# Patient Record
Sex: MALE | NOT HISPANIC OR LATINO
[De-identification: names, ages, dates, MRNs, and addresses within clinical notes are randomized per-mention and may not be internally consistent; named-entity substitution may affect disease eponyms.]

---

## 2019-02-06 PROBLEM — Z00.00 ENCOUNTER FOR PREVENTIVE HEALTH EXAMINATION: Status: ACTIVE | Noted: 2019-02-06

## 2019-02-14 ENCOUNTER — APPOINTMENT (OUTPATIENT)
Dept: UROLOGY | Facility: CLINIC | Age: 49
End: 2019-02-14
Payer: MEDICAID

## 2019-02-14 VITALS
TEMPERATURE: 98.3 F | HEIGHT: 73 IN | BODY MASS INDEX: 29.16 KG/M2 | HEART RATE: 74 BPM | DIASTOLIC BLOOD PRESSURE: 90 MMHG | WEIGHT: 220 LBS | RESPIRATION RATE: 16 BRPM | OXYGEN SATURATION: 98 % | SYSTOLIC BLOOD PRESSURE: 148 MMHG

## 2019-02-14 PROCEDURE — 99204 OFFICE O/P NEW MOD 45 MIN: CPT

## 2019-02-14 NOTE — REVIEW OF SYSTEMS
[Seen by urologist before (Name)  ___] : Preciously seen by a urologist: [unfilled] [Wake up at night to urinate  How many times?  ___] : wakes up to urinate [unfilled] times during the night [Strain or push to urinate] : strain or push to urinate [Bladder fullness after urinating] : bladder fullness after urinating [Negative] : Heme/Lymph

## 2019-02-15 LAB
APPEARANCE: ABNORMAL
BACTERIA: ABNORMAL
BILIRUBIN URINE: NEGATIVE
BLOOD URINE: ABNORMAL
COLOR: YELLOW
GLUCOSE QUALITATIVE U: NEGATIVE MG/DL
HYALINE CASTS: 0 /LPF
KETONES URINE: NEGATIVE
LEUKOCYTE ESTERASE URINE: ABNORMAL
MICROSCOPIC-UA: NORMAL
NITRITE URINE: NEGATIVE
PH URINE: 6
PROTEIN URINE: 30 MG/DL
RED BLOOD CELLS URINE: 14 /HPF
SPECIFIC GRAVITY URINE: 1.02
SQUAMOUS EPITHELIAL CELLS: 3 /HPF
UROBILINOGEN URINE: NEGATIVE MG/DL
WHITE BLOOD CELLS URINE: >720 /HPF

## 2019-02-15 RX ORDER — LISINOPRIL 30 MG/1
30 TABLET ORAL
Qty: 30 | Refills: 0 | Status: DISCONTINUED | COMMUNITY
Start: 2019-02-06 | End: 2019-02-15

## 2019-02-15 RX ORDER — TAMSULOSIN HYDROCHLORIDE 0.4 MG/1
0.4 CAPSULE ORAL
Qty: 30 | Refills: 0 | Status: DISCONTINUED | COMMUNITY
Start: 2018-09-27 | End: 2019-02-15

## 2019-02-15 NOTE — PHYSICAL EXAM
[Abdomen Soft] : soft [Abdomen Tenderness] : non-tender [Costovertebral Angle Tenderness] : no ~M costovertebral angle tenderness [General Appearance - Well Developed] : well developed [General Appearance - Well Nourished] : well nourished [General Appearance - In No Acute Distress] : no acute distress [Edema] : no peripheral edema [Exaggerated Use Of Accessory Muscles For Inspiration] : no accessory muscle use [FreeTextEntry1] : 30+cc prostate, smooth, no nodules [Normal Station and Gait] : the gait and station were normal for the patient's age [Skin Color & Pigmentation] : normal skin color and pigmentation [No Focal Deficits] : no focal deficits [Oriented To Time, Place, And Person] : oriented to person, place, and time [Cervical Lymph Nodes Enlarged Anterior Bilaterally] : anterior cervical [Supraclavicular Lymph Nodes Enlarged Bilaterally] : supraclavicular

## 2019-02-15 NOTE — ASSESSMENT
[FreeTextEntry1] : Recurrent UTIs likely 2/2 colonization with prostate and stone as source. Stone likely 2/2 BPH. Needs not only stone procedure (removal) but also outlet procedure to help with urinary sx and aid bladder emptying. \par --UA, UCx. will call pt with results\par --Cysto eval given pneumaturia and to confirm prostate size\par --Likely plan for robotic assisted suprapubic prostatectomy with concomitant stone removal (cystolithotomy). \par

## 2019-02-15 NOTE — HISTORY OF PRESENT ILLNESS
[FreeTextEntry1] : 47yo gentleman with cc of recurrent UTI and bladder stone. Pt was seen by urologist for recurrent UTIs. He has had 3 infections. He had no anatomic eval and was treated with abx each time. He saw a partner who looked through old records and found stone in bladder. In retrospect, he has had stone in his bladder for years (on CT from 2016). He was scheduled for open cystolithotomy and then he had second thoughts and is here for a second opinion. No febrile infections. \par \par At baseline, he has urinary urgency and straining. He gets pain with straining. Has had pneumaturia but no fecaluria. No family hx of prostate ca. No known hx of PSA. No prior hx of surgery. Had episode of retention in 2016 requiring catheter placement. Thats when CT was done. Stone then measured 1cm. \par \par Review of CT dense 2.7cm stone in bladder. Pt with enlarged prostate estimated at 105cc from report. Appears smaller than that on my review. Pt also with air in bladder. No hx of diverticulosis.

## 2019-02-17 LAB — BACTERIA UR CULT: ABNORMAL

## 2019-02-21 ENCOUNTER — APPOINTMENT (OUTPATIENT)
Dept: UROLOGY | Facility: CLINIC | Age: 49
End: 2019-02-21
Payer: MEDICAID

## 2019-02-21 VITALS
SYSTOLIC BLOOD PRESSURE: 160 MMHG | WEIGHT: 220 LBS | HEIGHT: 73 IN | RESPIRATION RATE: 14 BRPM | OXYGEN SATURATION: 98 % | DIASTOLIC BLOOD PRESSURE: 92 MMHG | BODY MASS INDEX: 29.16 KG/M2 | HEART RATE: 111 BPM

## 2019-02-21 DIAGNOSIS — Z82.49 FAMILY HISTORY OF ISCHEMIC HEART DISEASE AND OTHER DISEASES OF THE CIRCULATORY SYSTEM: ICD-10-CM

## 2019-02-21 DIAGNOSIS — Z83.79 FAMILY HISTORY OF OTHER DISEASES OF THE DIGESTIVE SYSTEM: ICD-10-CM

## 2019-02-21 DIAGNOSIS — Z86.79 PERSONAL HISTORY OF OTHER DISEASES OF THE CIRCULATORY SYSTEM: ICD-10-CM

## 2019-02-21 PROCEDURE — 52000 CYSTOURETHROSCOPY: CPT

## 2019-03-15 ENCOUNTER — OUTPATIENT (OUTPATIENT)
Dept: OUTPATIENT SERVICES | Facility: HOSPITAL | Age: 49
LOS: 1 days | End: 2019-03-15
Payer: MEDICAID

## 2019-03-15 VITALS
DIASTOLIC BLOOD PRESSURE: 78 MMHG | HEIGHT: 72.75 IN | HEART RATE: 90 BPM | OXYGEN SATURATION: 98 % | TEMPERATURE: 98 F | RESPIRATION RATE: 14 BRPM | WEIGHT: 218.04 LBS | SYSTOLIC BLOOD PRESSURE: 136 MMHG

## 2019-03-15 DIAGNOSIS — N39.0 URINARY TRACT INFECTION, SITE NOT SPECIFIED: ICD-10-CM

## 2019-03-15 DIAGNOSIS — S42.309A UNSPECIFIED FRACTURE OF SHAFT OF HUMERUS, UNSPECIFIED ARM, INITIAL ENCOUNTER FOR CLOSED FRACTURE: Chronic | ICD-10-CM

## 2019-03-15 DIAGNOSIS — N40.1 BENIGN PROSTATIC HYPERPLASIA WITH LOWER URINARY TRACT SYMPTOMS: ICD-10-CM

## 2019-03-15 LAB
ANION GAP SERPL CALC-SCNC: 12 MMO/L — SIGNIFICANT CHANGE UP (ref 7–14)
APPEARANCE UR: SIGNIFICANT CHANGE UP
BACTERIA # UR AUTO: HIGH
BILIRUB UR-MCNC: NEGATIVE — SIGNIFICANT CHANGE UP
BLD GP AB SCN SERPL QL: NEGATIVE — SIGNIFICANT CHANGE UP
BLOOD UR QL VISUAL: SIGNIFICANT CHANGE UP
BUN SERPL-MCNC: 19 MG/DL — SIGNIFICANT CHANGE UP (ref 7–23)
CALCIUM SERPL-MCNC: 10.2 MG/DL — SIGNIFICANT CHANGE UP (ref 8.4–10.5)
CHLORIDE SERPL-SCNC: 100 MMOL/L — SIGNIFICANT CHANGE UP (ref 98–107)
CO2 SERPL-SCNC: 26 MMOL/L — SIGNIFICANT CHANGE UP (ref 22–31)
COLOR SPEC: YELLOW — SIGNIFICANT CHANGE UP
CREAT SERPL-MCNC: 1.16 MG/DL — SIGNIFICANT CHANGE UP (ref 0.5–1.3)
GLUCOSE SERPL-MCNC: 97 MG/DL — SIGNIFICANT CHANGE UP (ref 70–99)
GLUCOSE UR-MCNC: 300 — HIGH
HBA1C BLD-MCNC: 8.6 % — HIGH (ref 4–5.6)
HCT VFR BLD CALC: 45.9 % — SIGNIFICANT CHANGE UP (ref 39–50)
HGB BLD-MCNC: 14.6 G/DL — SIGNIFICANT CHANGE UP (ref 13–17)
HYALINE CASTS # UR AUTO: NEGATIVE — SIGNIFICANT CHANGE UP
KETONES UR-MCNC: NEGATIVE — SIGNIFICANT CHANGE UP
LEUKOCYTE ESTERASE UR-ACNC: SIGNIFICANT CHANGE UP
MCHC RBC-ENTMCNC: 28.6 PG — SIGNIFICANT CHANGE UP (ref 27–34)
MCHC RBC-ENTMCNC: 31.8 % — LOW (ref 32–36)
MCV RBC AUTO: 89.8 FL — SIGNIFICANT CHANGE UP (ref 80–100)
NITRITE UR-MCNC: POSITIVE — HIGH
NRBC # FLD: 0 K/UL — LOW (ref 25–125)
PH UR: 6.5 — SIGNIFICANT CHANGE UP (ref 5–8)
PLATELET # BLD AUTO: 294 K/UL — SIGNIFICANT CHANGE UP (ref 150–400)
PMV BLD: 9.9 FL — SIGNIFICANT CHANGE UP (ref 7–13)
POTASSIUM SERPL-MCNC: 3.7 MMOL/L — SIGNIFICANT CHANGE UP (ref 3.5–5.3)
POTASSIUM SERPL-SCNC: 3.7 MMOL/L — SIGNIFICANT CHANGE UP (ref 3.5–5.3)
PROT UR-MCNC: 50 — SIGNIFICANT CHANGE UP
RBC # BLD: 5.11 M/UL — SIGNIFICANT CHANGE UP (ref 4.2–5.8)
RBC # FLD: 12.4 % — SIGNIFICANT CHANGE UP (ref 10.3–14.5)
RBC CASTS # UR COMP ASSIST: HIGH (ref 0–?)
RH IG SCN BLD-IMP: POSITIVE — SIGNIFICANT CHANGE UP
SODIUM SERPL-SCNC: 138 MMOL/L — SIGNIFICANT CHANGE UP (ref 135–145)
SP GR SPEC: 1.02 — SIGNIFICANT CHANGE UP (ref 1–1.04)
SQUAMOUS # UR AUTO: SIGNIFICANT CHANGE UP
UROBILINOGEN FLD QL: NORMAL — SIGNIFICANT CHANGE UP
WBC # BLD: 7.85 K/UL — SIGNIFICANT CHANGE UP (ref 3.8–10.5)
WBC # FLD AUTO: 7.85 K/UL — SIGNIFICANT CHANGE UP (ref 3.8–10.5)
WBC UR QL: >50 — HIGH (ref 0–?)

## 2019-03-15 PROCEDURE — 93010 ELECTROCARDIOGRAM REPORT: CPT

## 2019-03-15 NOTE — H&P PST ADULT - NEGATIVE OPHTHALMOLOGIC SYMPTOMS
no pain R/no loss of vision L/no scleral injection L/no photophobia/no discharge L/no pain L/no scleral injection R/no diplopia/no lacrimation L/no lacrimation R/no blurred vision L/no blurred vision R/no discharge R/no irritation L/no loss of vision R/no irritation R

## 2019-03-15 NOTE — H&P PST ADULT - NSICDXPROBLEM_GEN_ALL_CORE_FT
PROBLEM DIAGNOSES  Problem: BPH with obstruction/lower urinary tract symptoms  Assessment and Plan: Pt scheduled for robotic laparosocpic suprapubic prostatectomy with cystolitholopaxy on 4/3/2019.  labs done results pending, ekg done.  Hbiclens provided.  Preop teaching done, pt able to verbalize understanding,  DM pt instructed to hold metformin 48 hrs prior to surgery last dose 3/31/2019

## 2019-03-15 NOTE — H&P PST ADULT - HISTORY OF PRESENT ILLNESS
47y/o male scheduled for robotic assisted laparoscopic suprapubic prostatectomy with cystolithalopaxy. 47y/o male scheduled for robotic assisted laparoscopic suprapubic prostatectomy with cystolithalopaxy.   Pt states, "dx enlarged prostate and bladder stone since 2016,   C/o of weak stream, hesitancy, tx multiple times for UTI."

## 2019-03-15 NOTE — H&P PST ADULT - NEGATIVE GENERAL SYMPTOMS
no polyphagia/no fatigue/no weight gain/no polyuria/no polydipsia/no fever/no chills/no sweating/no anorexia/no malaise/no weight loss

## 2019-03-15 NOTE — H&P PST ADULT - MARITAL STATUS
/3 children, mother  88y/o breast cancer, father  62y/o pneumonia 8 siblings 1 sister  stomach problmes

## 2019-03-15 NOTE — H&P PST ADULT - GASTROINTESTINAL DETAILS
nontender/no distention/no rebound tenderness/no guarding/no organomegaly/no bruit/no rigidity/bowel sounds normal/soft/no masses palpable

## 2019-03-15 NOTE — H&P PST ADULT - NSICDXPASTMEDICALHX_GEN_ALL_CORE_FT
PAST MEDICAL HISTORY:  Diabetes mellitus type 2    Elevated IOP     History of bladder stone     History of BPH     Hypertension

## 2019-03-15 NOTE — H&P PST ADULT - NSANTHOSAYNRD_GEN_A_CORE
No. CORINE screening performed.  STOP BANG Legend: 0-2 = LOW Risk; 3-4 = INTERMEDIATE Risk; 5-8 = HIGH Risk

## 2019-03-15 NOTE — H&P PST ADULT - ENDOCRINE COMMENTS
recently dm with dm type 2 was started on metformin, doesn't check bs recently dm with dm type 2 was prescribed  metformin  will start medication today , doesn't check bs

## 2019-03-15 NOTE — H&P PST ADULT - RS GEN PE MLT RESP DETAILS PC
no rhonchi/no intercostal retractions/no chest wall tenderness/good air movement/no rales/no wheezes/breath sounds equal/clear to auscultation bilaterally/respirations non-labored

## 2019-03-17 LAB — SPECIMEN SOURCE: SIGNIFICANT CHANGE UP

## 2019-03-18 LAB
-  AMIKACIN: SIGNIFICANT CHANGE UP
-  AMPICILLIN/SULBACTAM: SIGNIFICANT CHANGE UP
-  AMPICILLIN: SIGNIFICANT CHANGE UP
-  AZTREONAM: SIGNIFICANT CHANGE UP
-  CEFAZOLIN: SIGNIFICANT CHANGE UP
-  CEFEPIME: SIGNIFICANT CHANGE UP
-  CEFOXITIN: SIGNIFICANT CHANGE UP
-  CEFTAZIDIME: SIGNIFICANT CHANGE UP
-  CEFTRIAXONE: SIGNIFICANT CHANGE UP
-  CIPROFLOXACIN: SIGNIFICANT CHANGE UP
-  ERTAPENEM: SIGNIFICANT CHANGE UP
-  GENTAMICIN: SIGNIFICANT CHANGE UP
-  IMIPENEM: SIGNIFICANT CHANGE UP
-  LEVOFLOXACIN: SIGNIFICANT CHANGE UP
-  MEROPENEM: SIGNIFICANT CHANGE UP
-  NITROFURANTOIN: SIGNIFICANT CHANGE UP
-  PIPERACILLIN/TAZOBACTAM: SIGNIFICANT CHANGE UP
-  TIGECYCLINE: SIGNIFICANT CHANGE UP
-  TOBRAMYCIN: SIGNIFICANT CHANGE UP
-  TRIMETHOPRIM/SULFAMETHOXAZOLE: SIGNIFICANT CHANGE UP
BACTERIA UR CULT: SIGNIFICANT CHANGE UP
METHOD TYPE: SIGNIFICANT CHANGE UP
ORGANISM # SPEC MICROSCOPIC CNT: SIGNIFICANT CHANGE UP
ORGANISM # SPEC MICROSCOPIC CNT: SIGNIFICANT CHANGE UP

## 2019-03-22 ENCOUNTER — MESSAGE (OUTPATIENT)
Age: 49
End: 2019-03-22

## 2019-04-01 ENCOUNTER — OUTPATIENT (OUTPATIENT)
Dept: OUTPATIENT SERVICES | Facility: HOSPITAL | Age: 49
LOS: 1 days | End: 2019-04-01
Payer: COMMERCIAL

## 2019-04-01 DIAGNOSIS — S42.309A UNSPECIFIED FRACTURE OF SHAFT OF HUMERUS, UNSPECIFIED ARM, INITIAL ENCOUNTER FOR CLOSED FRACTURE: Chronic | ICD-10-CM

## 2019-04-01 PROCEDURE — G9001: CPT

## 2019-04-02 ENCOUNTER — TRANSCRIPTION ENCOUNTER (OUTPATIENT)
Age: 49
End: 2019-04-02

## 2019-04-03 ENCOUNTER — APPOINTMENT (OUTPATIENT)
Dept: UROLOGY | Facility: HOSPITAL | Age: 49
End: 2019-04-03

## 2019-04-03 ENCOUNTER — RESULT REVIEW (OUTPATIENT)
Age: 49
End: 2019-04-03

## 2019-04-03 ENCOUNTER — INPATIENT (INPATIENT)
Facility: HOSPITAL | Age: 49
LOS: 0 days | Discharge: HOME | End: 2019-04-04
Attending: UROLOGY | Admitting: UROLOGY
Payer: MEDICAID

## 2019-04-03 VITALS
RESPIRATION RATE: 16 BRPM | OXYGEN SATURATION: 98 % | HEIGHT: 72.75 IN | DIASTOLIC BLOOD PRESSURE: 98 MMHG | TEMPERATURE: 98 F | WEIGHT: 218.04 LBS | SYSTOLIC BLOOD PRESSURE: 143 MMHG | HEART RATE: 84 BPM

## 2019-04-03 DIAGNOSIS — N39.0 URINARY TRACT INFECTION, SITE NOT SPECIFIED: ICD-10-CM

## 2019-04-03 DIAGNOSIS — S42.309A UNSPECIFIED FRACTURE OF SHAFT OF HUMERUS, UNSPECIFIED ARM, INITIAL ENCOUNTER FOR CLOSED FRACTURE: Chronic | ICD-10-CM

## 2019-04-03 LAB
ANION GAP SERPL CALC-SCNC: 11 MMO/L — SIGNIFICANT CHANGE UP (ref 7–14)
BASOPHILS # BLD AUTO: 0.04 K/UL — SIGNIFICANT CHANGE UP (ref 0–0.2)
BASOPHILS NFR BLD AUTO: 0.4 % — SIGNIFICANT CHANGE UP (ref 0–2)
BUN SERPL-MCNC: 16 MG/DL — SIGNIFICANT CHANGE UP (ref 7–23)
CALCIUM SERPL-MCNC: 9.1 MG/DL — SIGNIFICANT CHANGE UP (ref 8.4–10.5)
CHLORIDE SERPL-SCNC: 99 MMOL/L — SIGNIFICANT CHANGE UP (ref 98–107)
CO2 SERPL-SCNC: 24 MMOL/L — SIGNIFICANT CHANGE UP (ref 22–31)
CREAT SERPL-MCNC: 1.47 MG/DL — HIGH (ref 0.5–1.3)
EOSINOPHIL # BLD AUTO: 0.01 K/UL — SIGNIFICANT CHANGE UP (ref 0–0.5)
EOSINOPHIL NFR BLD AUTO: 0.1 % — SIGNIFICANT CHANGE UP (ref 0–6)
GLUCOSE BLDC GLUCOMTR-MCNC: 110 MG/DL — HIGH (ref 70–99)
GLUCOSE BLDC GLUCOMTR-MCNC: 235 MG/DL — HIGH (ref 70–99)
GLUCOSE SERPL-MCNC: 169 MG/DL — HIGH (ref 70–99)
HCT VFR BLD CALC: 45.1 % — SIGNIFICANT CHANGE UP (ref 39–50)
HGB BLD-MCNC: 14.4 G/DL — SIGNIFICANT CHANGE UP (ref 13–17)
IMM GRANULOCYTES NFR BLD AUTO: 0.4 % — SIGNIFICANT CHANGE UP (ref 0–1.5)
LYMPHOCYTES # BLD AUTO: 1.66 K/UL — SIGNIFICANT CHANGE UP (ref 1–3.3)
LYMPHOCYTES # BLD AUTO: 16.9 % — SIGNIFICANT CHANGE UP (ref 13–44)
MCHC RBC-ENTMCNC: 28.9 PG — SIGNIFICANT CHANGE UP (ref 27–34)
MCHC RBC-ENTMCNC: 31.9 % — LOW (ref 32–36)
MCV RBC AUTO: 90.4 FL — SIGNIFICANT CHANGE UP (ref 80–100)
MONOCYTES # BLD AUTO: 0.22 K/UL — SIGNIFICANT CHANGE UP (ref 0–0.9)
MONOCYTES NFR BLD AUTO: 2.2 % — SIGNIFICANT CHANGE UP (ref 2–14)
NEUTROPHILS # BLD AUTO: 7.85 K/UL — HIGH (ref 1.8–7.4)
NEUTROPHILS NFR BLD AUTO: 80 % — HIGH (ref 43–77)
NRBC # FLD: 0 K/UL — SIGNIFICANT CHANGE UP (ref 0–0)
PLATELET # BLD AUTO: 260 K/UL — SIGNIFICANT CHANGE UP (ref 150–400)
PMV BLD: 9.3 FL — SIGNIFICANT CHANGE UP (ref 7–13)
POTASSIUM SERPL-MCNC: 4.4 MMOL/L — SIGNIFICANT CHANGE UP (ref 3.5–5.3)
POTASSIUM SERPL-SCNC: 4.4 MMOL/L — SIGNIFICANT CHANGE UP (ref 3.5–5.3)
RBC # BLD: 4.99 M/UL — SIGNIFICANT CHANGE UP (ref 4.2–5.8)
RBC # FLD: 12.9 % — SIGNIFICANT CHANGE UP (ref 10.3–14.5)
RH IG SCN BLD-IMP: POSITIVE — SIGNIFICANT CHANGE UP
SODIUM SERPL-SCNC: 134 MMOL/L — LOW (ref 135–145)
WBC # BLD: 9.82 K/UL — SIGNIFICANT CHANGE UP (ref 3.8–10.5)
WBC # FLD AUTO: 9.82 K/UL — SIGNIFICANT CHANGE UP (ref 3.8–10.5)

## 2019-04-03 PROCEDURE — 51050 REMOVAL OF BLADDER STONE: CPT | Mod: 59

## 2019-04-03 PROCEDURE — 88307 TISSUE EXAM BY PATHOLOGIST: CPT | Mod: 26

## 2019-04-03 PROCEDURE — 55821 REMOVAL OF PROSTATE: CPT

## 2019-04-03 PROCEDURE — 88300 SURGICAL PATH GROSS: CPT | Mod: 26,59

## 2019-04-03 PROCEDURE — 51999 UNLISTED LAPS PX BLADDER: CPT | Mod: NC

## 2019-04-03 RX ORDER — HYDROMORPHONE HYDROCHLORIDE 2 MG/ML
0.5 INJECTION INTRAMUSCULAR; INTRAVENOUS; SUBCUTANEOUS
Qty: 0 | Refills: 0 | Status: DISCONTINUED | OUTPATIENT
Start: 2019-04-03 | End: 2019-04-03

## 2019-04-03 RX ORDER — CEFAZOLIN SODIUM 1 G
1000 VIAL (EA) INJECTION ONCE
Qty: 0 | Refills: 0 | Status: DISCONTINUED | OUTPATIENT
Start: 2019-04-03 | End: 2019-04-03

## 2019-04-03 RX ORDER — LISINOPRIL 2.5 MG/1
1 TABLET ORAL
Qty: 0 | Refills: 0 | COMMUNITY

## 2019-04-03 RX ORDER — DEXTROSE 50 % IN WATER 50 %
25 SYRINGE (ML) INTRAVENOUS ONCE
Qty: 0 | Refills: 0 | Status: DISCONTINUED | OUTPATIENT
Start: 2019-04-03 | End: 2019-04-04

## 2019-04-03 RX ORDER — CEFAZOLIN SODIUM 1 G
VIAL (EA) INJECTION
Qty: 0 | Refills: 0 | Status: DISCONTINUED | OUTPATIENT
Start: 2019-04-03 | End: 2019-04-03

## 2019-04-03 RX ORDER — LIDOCAINE 4 G/100G
1 CREAM TOPICAL
Qty: 0 | Refills: 0 | Status: DISCONTINUED | OUTPATIENT
Start: 2019-04-03 | End: 2019-04-04

## 2019-04-03 RX ORDER — SENNA PLUS 8.6 MG/1
2 TABLET ORAL AT BEDTIME
Qty: 0 | Refills: 0 | Status: DISCONTINUED | OUTPATIENT
Start: 2019-04-03 | End: 2019-04-04

## 2019-04-03 RX ORDER — OXYBUTYNIN CHLORIDE 5 MG
5 TABLET ORAL THREE TIMES A DAY
Qty: 0 | Refills: 0 | Status: DISCONTINUED | OUTPATIENT
Start: 2019-04-03 | End: 2019-04-04

## 2019-04-03 RX ORDER — HEPARIN SODIUM 5000 [USP'U]/ML
5000 INJECTION INTRAVENOUS; SUBCUTANEOUS EVERY 12 HOURS
Qty: 0 | Refills: 0 | Status: DISCONTINUED | OUTPATIENT
Start: 2019-04-03 | End: 2019-04-04

## 2019-04-03 RX ORDER — INSULIN LISPRO 100/ML
VIAL (ML) SUBCUTANEOUS AT BEDTIME
Qty: 0 | Refills: 0 | Status: DISCONTINUED | OUTPATIENT
Start: 2019-04-03 | End: 2019-04-04

## 2019-04-03 RX ORDER — SODIUM CHLORIDE 9 MG/ML
1000 INJECTION, SOLUTION INTRAVENOUS
Qty: 0 | Refills: 0 | Status: DISCONTINUED | OUTPATIENT
Start: 2019-04-03 | End: 2019-04-04

## 2019-04-03 RX ORDER — DEXTROSE 50 % IN WATER 50 %
15 SYRINGE (ML) INTRAVENOUS ONCE
Qty: 0 | Refills: 0 | Status: DISCONTINUED | OUTPATIENT
Start: 2019-04-03 | End: 2019-04-04

## 2019-04-03 RX ORDER — ONDANSETRON 8 MG/1
4 TABLET, FILM COATED ORAL ONCE
Qty: 0 | Refills: 0 | Status: DISCONTINUED | OUTPATIENT
Start: 2019-04-03 | End: 2019-04-03

## 2019-04-03 RX ORDER — CEFAZOLIN SODIUM 1 G
1000 VIAL (EA) INJECTION EVERY 8 HOURS
Qty: 0 | Refills: 0 | Status: DISCONTINUED | OUTPATIENT
Start: 2019-04-03 | End: 2019-04-04

## 2019-04-03 RX ORDER — METOCLOPRAMIDE HCL 10 MG
10 TABLET ORAL EVERY 6 HOURS
Qty: 0 | Refills: 0 | Status: DISCONTINUED | OUTPATIENT
Start: 2019-04-03 | End: 2019-04-03

## 2019-04-03 RX ORDER — GLUCAGON INJECTION, SOLUTION 0.5 MG/.1ML
1 INJECTION, SOLUTION SUBCUTANEOUS ONCE
Qty: 0 | Refills: 0 | Status: DISCONTINUED | OUTPATIENT
Start: 2019-04-03 | End: 2019-04-04

## 2019-04-03 RX ORDER — LISINOPRIL 2.5 MG/1
30 TABLET ORAL DAILY
Qty: 0 | Refills: 0 | Status: DISCONTINUED | OUTPATIENT
Start: 2019-04-03 | End: 2019-04-04

## 2019-04-03 RX ORDER — INSULIN LISPRO 100/ML
VIAL (ML) SUBCUTANEOUS
Qty: 0 | Refills: 0 | Status: DISCONTINUED | OUTPATIENT
Start: 2019-04-03 | End: 2019-04-04

## 2019-04-03 RX ORDER — METFORMIN HYDROCHLORIDE 850 MG/1
1 TABLET ORAL
Qty: 0 | Refills: 0 | COMMUNITY

## 2019-04-03 RX ORDER — DOCUSATE SODIUM 100 MG
100 CAPSULE ORAL THREE TIMES A DAY
Qty: 0 | Refills: 0 | Status: DISCONTINUED | OUTPATIENT
Start: 2019-04-03 | End: 2019-04-04

## 2019-04-03 RX ORDER — DEXTROSE 50 % IN WATER 50 %
12.5 SYRINGE (ML) INTRAVENOUS ONCE
Qty: 0 | Refills: 0 | Status: DISCONTINUED | OUTPATIENT
Start: 2019-04-03 | End: 2019-04-04

## 2019-04-03 RX ORDER — SODIUM CHLORIDE 9 MG/ML
1000 INJECTION, SOLUTION INTRAVENOUS
Qty: 0 | Refills: 0 | Status: DISCONTINUED | OUTPATIENT
Start: 2019-04-03 | End: 2019-04-03

## 2019-04-03 RX ADMIN — Medication 100 MILLIGRAM(S): at 21:48

## 2019-04-03 RX ADMIN — SENNA PLUS 2 TABLET(S): 8.6 TABLET ORAL at 21:48

## 2019-04-03 RX ADMIN — HEPARIN SODIUM 5000 UNIT(S): 5000 INJECTION INTRAVENOUS; SUBCUTANEOUS at 21:48

## 2019-04-03 NOTE — PROGRESS NOTE ADULT - ASSESSMENT
48 year old male, s/p suprapubic prostatectomy and removal of bladder stones, stable    -Continue CBI off traction  -Monitor color  -Monitor I&O's  -Analgesia prn  -Antiemetics prn  -DVT prophylaxis  -Incentive spirometry  -Clears   -OOB

## 2019-04-03 NOTE — PROGRESS NOTE ADULT - SUBJECTIVE AND OBJECTIVE BOX
Note    Post op Check    s/p Suprapubic prostatectomy and removal of bladder stones.     Pt seen and examined, reports some mild pain, denies nausea    Vital Signs Last 24 Hrs  T(C): 36.5 (03 Apr 2019 19:00), Max: 37.3 (03 Apr 2019 17:40)  T(F): 97.7 (03 Apr 2019 19:00), Max: 99.1 (03 Apr 2019 17:40)  HR: 72 (03 Apr 2019 19:00) (67 - 84)  BP: 153/95 (03 Apr 2019 19:00) (143/98 - 160/102)  BP(mean): 108 (03 Apr 2019 19:00) (105 - 115)  RR: 19 (03 Apr 2019 19:00) (16 - 23)  SpO2: 100% (03 Apr 2019 19:00) (95% - 100%)    I&O's Summary    Hay, CBI  GRACY    PHYSICAL EXAM:   Constitutional: well appearing, A+Ox3, no acute distress    Respiratory: Clear    Cardiovascular: Regular    Gastrointestinal: distended, minimal tenderness, dressings  in place, clean and dry    Genitourinary: hay in place, CBI running, clear light pink, GRACY in place, draining well, sanguinous    Extremities: venodynes in place     LABS:                        14.4   9.82  )-----------( 260      ( 03 Apr 2019 18:26 )             45.1       04-03    134<L>  |  99  |  16  ----------------------------<  169<H>  4.4   |  24  |  1.47<H>    Ca    9.1      03 Apr 2019 17:42

## 2019-04-03 NOTE — ASU PREOP CHECKLIST - SELECT TESTS ORDERED
CMP/Type and Screen/Urinalysis/EKG/CBC/POCT Blood Glucose CBC/Urinalysis/EKG/110 at : 40/CMP/Type and Screen/POCT Blood Glucose

## 2019-04-03 NOTE — BRIEF OPERATIVE NOTE - NSICDXBRIEFPROCEDURE_GEN_ALL_CORE_FT
PROCEDURES:  Suprapubic prostatectomy 03-Apr-2019 17:35:34 with removal of bladder stones Jose M Walters P

## 2019-04-04 ENCOUNTER — TRANSCRIPTION ENCOUNTER (OUTPATIENT)
Age: 49
End: 2019-04-04

## 2019-04-04 VITALS
HEART RATE: 82 BPM | DIASTOLIC BLOOD PRESSURE: 85 MMHG | SYSTOLIC BLOOD PRESSURE: 148 MMHG | OXYGEN SATURATION: 97 % | TEMPERATURE: 98 F | RESPIRATION RATE: 17 BRPM

## 2019-04-04 DIAGNOSIS — Z71.89 OTHER SPECIFIED COUNSELING: ICD-10-CM

## 2019-04-04 DIAGNOSIS — I10 ESSENTIAL (PRIMARY) HYPERTENSION: ICD-10-CM

## 2019-04-04 DIAGNOSIS — Z87.448 PERSONAL HISTORY OF OTHER DISEASES OF URINARY SYSTEM: ICD-10-CM

## 2019-04-04 DIAGNOSIS — E11.9 TYPE 2 DIABETES MELLITUS WITHOUT COMPLICATIONS: ICD-10-CM

## 2019-04-04 DIAGNOSIS — Z29.9 ENCOUNTER FOR PROPHYLACTIC MEASURES, UNSPECIFIED: ICD-10-CM

## 2019-04-04 PROBLEM — Z87.438 PERSONAL HISTORY OF OTHER DISEASES OF MALE GENITAL ORGANS: Chronic | Status: ACTIVE | Noted: 2019-03-15

## 2019-04-04 PROBLEM — H40.059 OCULAR HYPERTENSION, UNSPECIFIED EYE: Chronic | Status: ACTIVE | Noted: 2019-03-15

## 2019-04-04 LAB
ANION GAP SERPL CALC-SCNC: 17 MMO/L — HIGH (ref 7–14)
BUN SERPL-MCNC: 16 MG/DL — SIGNIFICANT CHANGE UP (ref 7–23)
CALCIUM SERPL-MCNC: 9.8 MG/DL — SIGNIFICANT CHANGE UP (ref 8.4–10.5)
CHLORIDE SERPL-SCNC: 99 MMOL/L — SIGNIFICANT CHANGE UP (ref 98–107)
CO2 SERPL-SCNC: 20 MMOL/L — LOW (ref 22–31)
CREAT FLD-MCNC: 1.26 MG/DL — SIGNIFICANT CHANGE UP
CREAT SERPL-MCNC: 1.24 MG/DL — SIGNIFICANT CHANGE UP (ref 0.5–1.3)
GLUCOSE BLDC GLUCOMTR-MCNC: 114 MG/DL — HIGH (ref 70–99)
GLUCOSE BLDC GLUCOMTR-MCNC: 124 MG/DL — HIGH (ref 70–99)
GLUCOSE BLDC GLUCOMTR-MCNC: 136 MG/DL — HIGH (ref 70–99)
GLUCOSE SERPL-MCNC: 169 MG/DL — HIGH (ref 70–99)
HCT VFR BLD CALC: 43.2 % — SIGNIFICANT CHANGE UP (ref 39–50)
HGB BLD-MCNC: 14.2 G/DL — SIGNIFICANT CHANGE UP (ref 13–17)
MCHC RBC-ENTMCNC: 29.2 PG — SIGNIFICANT CHANGE UP (ref 27–34)
MCHC RBC-ENTMCNC: 32.9 % — SIGNIFICANT CHANGE UP (ref 32–36)
MCV RBC AUTO: 88.7 FL — SIGNIFICANT CHANGE UP (ref 80–100)
NRBC # FLD: 0 K/UL — SIGNIFICANT CHANGE UP (ref 0–0)
PLATELET # BLD AUTO: 305 K/UL — SIGNIFICANT CHANGE UP (ref 150–400)
PMV BLD: 10.1 FL — SIGNIFICANT CHANGE UP (ref 7–13)
POTASSIUM SERPL-MCNC: 4.5 MMOL/L — SIGNIFICANT CHANGE UP (ref 3.5–5.3)
POTASSIUM SERPL-SCNC: 4.5 MMOL/L — SIGNIFICANT CHANGE UP (ref 3.5–5.3)
RBC # BLD: 4.87 M/UL — SIGNIFICANT CHANGE UP (ref 4.2–5.8)
RBC # FLD: 12.9 % — SIGNIFICANT CHANGE UP (ref 10.3–14.5)
SODIUM SERPL-SCNC: 136 MMOL/L — SIGNIFICANT CHANGE UP (ref 135–145)
WBC # BLD: 11.81 K/UL — HIGH (ref 3.8–10.5)
WBC # FLD AUTO: 11.81 K/UL — HIGH (ref 3.8–10.5)

## 2019-04-04 PROCEDURE — 99223 1ST HOSP IP/OBS HIGH 75: CPT

## 2019-04-04 RX ORDER — ACETAMINOPHEN 500 MG
1000 TABLET ORAL ONCE
Qty: 0 | Refills: 0 | Status: DISCONTINUED | OUTPATIENT
Start: 2019-04-05 | End: 2019-04-04

## 2019-04-04 RX ORDER — ACETAMINOPHEN 500 MG
1000 TABLET ORAL ONCE
Qty: 0 | Refills: 0 | Status: DISCONTINUED | OUTPATIENT
Start: 2019-04-04 | End: 2019-04-04

## 2019-04-04 RX ORDER — ACETAMINOPHEN 500 MG
1000 TABLET ORAL ONCE
Qty: 0 | Refills: 0 | Status: COMPLETED | OUTPATIENT
Start: 2019-04-04 | End: 2019-04-04

## 2019-04-04 RX ORDER — DOCUSATE SODIUM 100 MG
1 CAPSULE ORAL
Qty: 0 | Refills: 0 | COMMUNITY
Start: 2019-04-04

## 2019-04-04 RX ORDER — LIDOCAINE 4 G/100G
1 CREAM TOPICAL
Qty: 15 | Refills: 0
Start: 2019-04-04

## 2019-04-04 RX ORDER — SODIUM CHLORIDE 9 MG/ML
1000 INJECTION INTRAMUSCULAR; INTRAVENOUS; SUBCUTANEOUS
Qty: 0 | Refills: 0 | Status: DISCONTINUED | OUTPATIENT
Start: 2019-04-04 | End: 2019-04-04

## 2019-04-04 RX ORDER — OXYBUTYNIN CHLORIDE 5 MG
1 TABLET ORAL
Qty: 15 | Refills: 0
Start: 2019-04-04

## 2019-04-04 RX ORDER — SENNA PLUS 8.6 MG/1
2 TABLET ORAL
Qty: 0 | Refills: 0 | COMMUNITY
Start: 2019-04-04

## 2019-04-04 RX ORDER — KETOROLAC TROMETHAMINE 30 MG/ML
15 SYRINGE (ML) INJECTION EVERY 6 HOURS
Qty: 0 | Refills: 0 | Status: DISCONTINUED | OUTPATIENT
Start: 2019-04-04 | End: 2019-04-04

## 2019-04-04 RX ORDER — OXYBUTYNIN CHLORIDE 5 MG
1 TABLET ORAL
Qty: 15 | Refills: 0 | OUTPATIENT
Start: 2019-04-04

## 2019-04-04 RX ORDER — LIDOCAINE 4 G/100G
1 CREAM TOPICAL
Qty: 15 | Refills: 0 | OUTPATIENT
Start: 2019-04-04

## 2019-04-04 RX ORDER — TRAMADOL HYDROCHLORIDE 50 MG/1
1 TABLET ORAL
Qty: 8 | Refills: 0
Start: 2019-04-04

## 2019-04-04 RX ORDER — TRAMADOL HYDROCHLORIDE 50 MG/1
1 TABLET ORAL
Qty: 8 | Refills: 0 | OUTPATIENT
Start: 2019-04-04

## 2019-04-04 RX ORDER — ACETAMINOPHEN 500 MG
2 TABLET ORAL
Qty: 0 | Refills: 0 | COMMUNITY

## 2019-04-04 RX ADMIN — Medication 100 MILLIGRAM(S): at 05:15

## 2019-04-04 RX ADMIN — Medication 400 MILLIGRAM(S): at 11:17

## 2019-04-04 RX ADMIN — HEPARIN SODIUM 5000 UNIT(S): 5000 INJECTION INTRAVENOUS; SUBCUTANEOUS at 11:16

## 2019-04-04 RX ADMIN — Medication 400 MILLIGRAM(S): at 06:13

## 2019-04-04 RX ADMIN — SODIUM CHLORIDE 75 MILLILITER(S): 9 INJECTION INTRAMUSCULAR; INTRAVENOUS; SUBCUTANEOUS at 11:17

## 2019-04-04 RX ADMIN — Medication 100 MILLIGRAM(S): at 14:06

## 2019-04-04 RX ADMIN — LISINOPRIL 30 MILLIGRAM(S): 2.5 TABLET ORAL at 13:55

## 2019-04-04 RX ADMIN — LIDOCAINE 1 APPLICATION(S): 4 CREAM TOPICAL at 05:16

## 2019-04-04 RX ADMIN — Medication 400 MILLIGRAM(S): at 18:52

## 2019-04-04 RX ADMIN — Medication 100 MILLIGRAM(S): at 13:55

## 2019-04-04 NOTE — DISCHARGE NOTE PROVIDER - NSDCACTIVITY_GEN_ALL_CORE
Stairs allowed/Walking - Indoors allowed/Showering allowed/Walking - Outdoors allowed/No heavy lifting/straining

## 2019-04-04 NOTE — DISCHARGE NOTE NURSING/CASE MANAGEMENT/SOCIAL WORK - NSDCPNINST_GEN_ALL_CORE
Make a follow up appointment with Dr. Moody. Call MD if you develop a fever, or if there is redness, swelling, drainage or pain not relieved by pain medication. No heavy lifting, bending, or straining to move your bowels. Take over the counter stool softeners as needed to prevent constipation which may be caused by pain medication. Delacruz care as instructed. Drink plenty of liquids. Your A1C= 8.6. Follow up with your endocrinologist or primary doctor regarding better diabetes management.

## 2019-04-04 NOTE — DISCHARGE NOTE PROVIDER - CARE PROVIDER_API CALL
Olivia Moody)  Urology  [address] [place] [address]  Phone: [phone]  Fax: [phone]  Follow Up Time:

## 2019-04-04 NOTE — DISCHARGE NOTE NURSING/CASE MANAGEMENT/SOCIAL WORK - NSDCDPATPORTLINK_GEN_ALL_CORE
You can access the [facility] Patient Portal, offered by [facility], by registering with the following website: [email or web address]

## 2019-04-04 NOTE — DISCHARGE NOTE PROVIDER - HOSPITAL COURSE
49 yo M underwent SPP, cystolithalopaxy on 4/3/19.  Postoperative course uneventful, CBI remained clear, d/c POD #1, urine remained acceptable in color.  GRACY Cr < serum, diet advanced without incident.  Pain controlled, ambulating without difficulty. GRACY d/c and pt d/c with hay to leg bag on POD #1 to f/u with Dr. Moody.

## 2019-04-04 NOTE — DISCHARGE NOTE NURSING/CASE MANAGEMENT/SOCIAL WORK - NSDPDISTO_GEN_ALL_CORE
Pt. is afebrile and offers no complaints. In no acute distress. Abdominal steris open to air: clean, dry and intact. Pt is ambulating, tolerating diet well, and hay patent and draining adequate amounts of urine./Home

## 2019-04-04 NOTE — CONSULT NOTE ADULT - PROBLEM SELECTOR RECOMMENDATION 9
-s/p suprapubic prostatectomy and removal of bladder stone 4/3/19  -postop management per primary team, pain control, IVF, cefazolin, await GI fxn, incentive spirometry, dvt ppx  -d/c plan per primary team

## 2019-04-04 NOTE — CONSULT NOTE ADULT - PROBLEM SELECTOR RECOMMENDATION 4
-c/w lisinopril with holding parameters  -monitor BP  -preop EKG 3/15 reviewed: NSR, no ischemic change

## 2019-04-04 NOTE — DISCHARGE NOTE NURSING/CASE MANAGEMENT/SOCIAL WORK - NSDCPEWEB_GEN_ALL_CORE
[facility] for Tobacco Control website --- [email or web address]

## 2019-04-04 NOTE — DISCHARGE NOTE NURSING/CASE MANAGEMENT/SOCIAL WORK - NSDCPEHOTLINE_GEN_ALL_CORE
[facility] Smokers Quitline [phone] ([phone])

## 2019-04-04 NOTE — CONSULT NOTE ADULT - SUBJECTIVE AND OBJECTIVE BOX
Rosette Leon MD  Pager [phone]    HPI:  47y/o male with h/o HTN, DM-2, symptomatic BPH with hesitancy and difficulty voiding, bladder stone, admitted for robotic assisted laparoscopic suprapubic prostatectomy with cystolithalopaxy on 4/3/19.   Pt states he has enlarged prostate and bladder stone since 2016,   c/o of weak stream, hesitancy, tx multiple times for UTI. Currently, pt feels well, pain controlled, no flatus yet, denies chest pain/sob/dizziness.       PAST MEDICAL & SURGICAL HISTORY:  Diabetes mellitus: type 2  Hypertension  History of bladder stone  History of BPH  Elevated IOP  Arm fracture: left 4 y/o      Review of Systems:   CONSTITUTIONAL: No fever, weight loss, or fatigue  EYES: No eye pain, visual disturbances, or discharge  ENMT:  No difficulty hearing, tinnitus, vertigo; No sinus or throat pain  NECK: No pain or stiffness  BREASTS: No pain, masses, or nipple discharge  RESPIRATORY: No cough, wheezing, chills or hemoptysis; No shortness of breath  CARDIOVASCULAR: No chest pain, palpitations, dizziness, or leg swelling  GASTROINTESTINAL: No abdominal or epigastric pain. No nausea, vomiting, or hematemesis; No diarrhea or constipation. No melena or hematochezia.  GENITOURINARY: hesitancy, frequency, difficulty voiding, denies hematuria, or incontinence  NEUROLOGICAL: No headaches, memory loss, loss of strength, numbness, or tremors  SKIN: No itching, burning, rashes, or lesions   LYMPH NODES: No enlarged glands  ENDOCRINE: No heat or cold intolerance; No hair loss  MUSCULOSKELETAL: No joint pain or swelling; No muscle, back, or extremity pain  PSYCHIATRIC: No depression, anxiety, mood swings, or difficulty sleeping  HEME/LYMPH: No easy bruising, or bleeding gums  ALLERY AND IMMUNOLOGIC: No hives or eczema    Allergies    No Known Allergies    Intolerances    Social History:  demoes alcohol use, light tobacco smoker (smokeless tobacco)    FAMILY HISTORY: noncontributory       Home Medications:  lisinopril 30 mg oral tablet: 1 tab(s) orally once a day in am (03 Apr 2019 11:18)  metFORMIN 850 mg oral tablet: 1 tab(s) orally 2 times a day (03 Apr 2019 11:18)      MEDICATIONS  (STANDING):  acetaminophen  IVPB .. 1000 milliGRAM(s) IV Intermittent once  acetaminophen  IVPB .. 1000 milliGRAM(s) IV Intermittent once  ceFAZolin   IVPB 1000 milliGRAM(s) IV Intermittent every 8 hours  dextrose 5%. 1000 milliLiter(s) (50 mL/Hr) IV Continuous <Continuous>  dextrose 50% Injectable 12.5 Gram(s) IV Push once  dextrose 50% Injectable 25 Gram(s) IV Push once  dextrose 50% Injectable 25 Gram(s) IV Push once  docusate sodium 100 milliGRAM(s) Oral three times a day  heparin  Injectable 5000 Unit(s) SubCutaneous every 12 hours  insulin lispro (HumaLOG) corrective regimen sliding scale   SubCutaneous three times a day before meals  insulin lispro (HumaLOG) corrective regimen sliding scale   SubCutaneous at bedtime  lidocaine 5% Ointment 1 Application(s) Topical every 3 hours  lisinopril 30 milliGRAM(s) Oral daily  senna 2 Tablet(s) Oral at bedtime  sodium chloride 0.9%. 1000 milliLiter(s) (75 mL/Hr) IV Continuous <Continuous>    MEDICATIONS  (PRN):  dextrose 40% Gel 15 Gram(s) Oral once PRN Blood Glucose LESS THAN 70 milliGRAM(s)/deciliter  glucagon  Injectable 1 milliGRAM(s) IntraMuscular once PRN Glucose LESS THAN 70 milligrams/deciliter  oxybutynin 5 milliGRAM(s) Oral three times a day PRN Bladder spasms      Vital Signs Last 24 Hrs  T(C): 36.7 (04 Apr 2019 09:12), Max: 37.3 (03 Apr 2019 17:40)  T(F): 98 (04 Apr 2019 09:12), Max: 99.1 (03 Apr 2019 17:40)  HR: 89 (04 Apr 2019 09:12) (67 - 111)  BP: 121/69 (04 Apr 2019 09:12) (117/68 - 169/97)  BP(mean): 108 (03 Apr 2019 19:00) (105 - 115)  RR: 17 (04 Apr 2019 09:12) (17 - 23)  SpO2: 98% (04 Apr 2019 09:12) (95% - 100%)  CAPILLARY BLOOD GLUCOSE      POCT Blood Glucose.: 136 mg/dL (04 Apr 2019 07:43)  POCT Blood Glucose.: 235 mg/dL (03 Apr 2019 22:21)  POCT Blood Glucose.: 110 mg/dL (03 Apr 2019 11:32)    I&O's Summary    03 Apr 2019 07:01  -  04 Apr 2019 07:00  --------------------------------------------------------  IN: 0 mL / OUT: 5 mL / NET: -5 mL    04 Apr 2019 07:01  -  04 Apr 2019 11:21  --------------------------------------------------------  IN: 225 mL / OUT: 178 mL / NET: 47 mL        PHYSICAL EXAM:  GENERAL: NAD, well-developed  HEAD:  Atraumatic, Normocephalic  EYES: EOMI, PERRLA, conjunctiva and sclera clear  NECK: Supple, No JVD  CHEST/LUNG: Clear to auscultation bilaterally; No wheeze  HEART: Regular rate and rhythm; No murmurs, rubs, or gallops  ABDOMEN: Soft, Nontender, Nondistended; Bowel sounds present, right GRACY with serosanguinous drainage  : hay  EXTREMITIES:  2+ Peripheral Pulses, No clubbing, cyanosis, or edema  PSYCH: AAOx3  NEUROLOGY: non-focal  SKIN: No rashes or lesions    LABS:                        14.2   11.81 )-----------( 305      ( 04 Apr 2019 05:57 )             43.2     04-04    136  |  99  |  16  ----------------------------<  169<H>  4.5   |  20<L>  |  1.24    Ca    9.8      04 Apr 2019 05:51      Microbiology     RADIOLOGY & ADDITIONAL TESTS:    Imaging Personally Reviewed:    Consultant(s) Notes Reviewed:      Care Discussed with Consultants/Other Providers:

## 2019-04-04 NOTE — DISCHARGE NOTE PROVIDER - NSDCCPTREATMENT_GEN_ALL_CORE_FT
PRINCIPAL PROCEDURE  Procedure: Suprapubic prostatectomy  Findings and Treatment: with removal of bladder stones

## 2019-04-04 NOTE — DISCHARGE NOTE NURSING/CASE MANAGEMENT/SOCIAL WORK - NSDCPEEMAIL_GEN_ALL_CORE
[facility] for Tobacco Control email [email or web address]

## 2019-04-04 NOTE — PROGRESS NOTE ADULT - SUBJECTIVE AND OBJECTIVE BOX
Overnight events:  None    Subjective:  Pt c/o incisional pain, tolerating CLD, no N/V, no flatus yet, not OOB yet    Objective:    Vital signs  T(C): , Max: 37.3 (04-03-19 @ 17:40)  HR: 103 (04-04-19 @ 04:52)  BP: 117/68 (04-04-19 @ 04:52)  SpO2: 98% (04-04-19 @ 04:52)  Wt(kg): --    Output   Delacruz:  CBI clear  GRACY: 5        Gen; NAD  Abd      Labs                        14.4   9.82  )-----------( 260      ( 03 Apr 2019 18:26 )             45.1     03 Apr 2019 17:42    134    |  99     |  16     ----------------------------<  169    4.4     |  24     |  1.47     Ca    9.1        03 Apr 2019 17:42        Urine Cx:   Blood Cx:     Imaging Overnight events:  None    Subjective:  Pt c/o incisional pain, tolerating CLD, no N/V, no flatus yet, not OOB yet    Objective:    Vital signs  T(C): , Max: 37.3 (04-03-19 @ 17:40)  HR: 103 (04-04-19 @ 04:52)  BP: 117/68 (04-04-19 @ 04:52)  SpO2: 98% (04-04-19 @ 04:52)  Wt(kg): --    Output   Delacruz:  CBI clear  GRACY: 5        Gen: NAD  Abd      Labs                        14.4   9.82  )-----------( 260      ( 03 Apr 2019 18:26 )             45.1     03 Apr 2019 17:42    134    |  99     |  16     ----------------------------<  169    4.4     |  24     |  1.47     Ca    9.1        03 Apr 2019 17:42        Urine Cx:   Blood Cx:     Imaging Overnight events:  None, CBI remained clear    Subjective:  Pt c/o incisional pain, tolerating CLD, no N/V, no flatus yet, not OOB yet    Objective:    Vital signs  T(C): , Max: 37.3 (04-03-19 @ 17:40)  HR: 103 (04-04-19 @ 04:52)  BP: 117/68 (04-04-19 @ 04:52)  SpO2: 98% (04-04-19 @ 04:52)  Wt(kg): --    Output   Delacruz:  CBI clear  GRACY: 5        Gen: NAD  Abd: incisions c/d/i, soft, nondistended, nontender  : bharti secured    Labs: pending

## 2019-04-04 NOTE — CONSULT NOTE ADULT - ASSESSMENT
47y/o male with h/o HTN, DM-2, symptomatic BPH with hesitancy and difficulty voiding, bladder stone, s/p laparoscopic suprapubic prostatectomy with cystolithalopaxy 4/3

## 2019-04-04 NOTE — DISCHARGE NOTE PROVIDER - NSDCCPCAREPLAN_GEN_ALL_CORE_FT
PRINCIPAL DISCHARGE DIAGNOSIS  Diagnosis: BPH with obstruction/lower urinary tract symptoms  Assessment and Plan of Treatment: Empty hay bag as needed as instructed.  Drink plenty of fluids, advance your diet slowly as tolerated.  Eat small, frequent amounts, your appetite will take a while to return to normal.  No heavy lifting or straining for 4 to 6 weeks, avoid constipation. You may have intermittent blood tinged urine and small amounts of leakage around hay (due to bladder spasms).  This is normal.   If your urine becomes bright red or with clots, or there is no urine in the bag, please call the office. You may shower, just pat white strips dry, they will fall off in a few weeks. Change dressing at drain site daily or as needed until dry.  Call Dr. Moody's office to schedule a follow up appointment next week for hay removal and further management.  Call the office if you have fever greater than 101, no urine in bag, pain not relieved with pain medication, nausea/vomiting.        SECONDARY DISCHARGE DIAGNOSES  Diagnosis: Diabetes mellitus  Assessment and Plan of Treatment: Continue current home medications and follow up with your primary care provider      Diagnosis: Hypertension  Assessment and Plan of Treatment: Continue current home medications and follow up with your primary care provider

## 2019-04-10 LAB — SURGICAL PATHOLOGY STUDY: SIGNIFICANT CHANGE UP

## 2019-04-12 ENCOUNTER — APPOINTMENT (OUTPATIENT)
Dept: UROLOGY | Facility: CLINIC | Age: 49
End: 2019-04-12
Payer: MEDICAID

## 2019-04-12 PROCEDURE — 99024 POSTOP FOLLOW-UP VISIT: CPT

## 2019-04-12 RX ORDER — CEFTRIAXONE 1 G/1
1 INJECTION, POWDER, FOR SOLUTION INTRAMUSCULAR; INTRAVENOUS
Qty: 1 | Refills: 0 | Status: COMPLETED | OUTPATIENT
Start: 2019-04-12

## 2019-04-12 RX ADMIN — CEFTRIAXONE SODIUM 0 GM: 1 INJECTION, POWDER, FOR SOLUTION INTRAMUSCULAR; INTRAVENOUS at 00:00

## 2019-04-12 NOTE — REVIEW OF SYSTEMS
[Strain or push to urinate] : strain or push to urinate [Wake up at night to urinate  How many times?  ___] : wakes up to urinate [unfilled] times during the night [Seen by urologist before (Name)  ___] : Preciously seen by a urologist: [unfilled] [Bladder fullness after urinating] : bladder fullness after urinating [Negative] : Endocrine

## 2019-04-12 NOTE — HISTORY OF PRESENT ILLNESS
[FreeTextEntry1] : 47yo gentleman with cc of recurrent UTI and bladder stone. Pt was seen by urologist for recurrent UTIs. He has had 3 infections. He had no anatomic eval and was treated with abx each time. He saw a partner who looked through old records and found stone in bladder. In retrospect, he has had stone in his bladder for years (on CT from 2016). He was scheduled for open cystolithotomy and then he had second thoughts and is here for a second opinion. No febrile infections. \par \par At baseline, he has urinary urgency and straining. He gets pain with straining. Has had pneumaturia but no fecaluria. No family hx of prostate ca. No known hx of PSA. No prior hx of surgery. Had episode of retention in 2016 requiring catheter placement. Thats when CT was done. Stone then measured 1cm. Review of CT dense 2.7cm stone in bladder. Pt with enlarged prostate estimated at 105cc from report. Appears smaller than that on my review. Pt also with air in bladder. No hx of diverticulosis. \par \par Plan made for RAL SPP with bladder stone removal. He is now POD #9. Doing well. Mild pain at L 12mm port site without hernia. Urine tea colored. Eating well. Cath removed today. gave IM ceftriaxone for UTI prevention. path benign

## 2019-04-12 NOTE — PHYSICAL EXAM
[General Appearance - Well Nourished] : well nourished [General Appearance - Well Developed] : well developed [General Appearance - In No Acute Distress] : no acute distress [Abdomen Tenderness] : non-tender [Abdomen Soft] : soft [Costovertebral Angle Tenderness] : no ~M costovertebral angle tenderness [Skin Color & Pigmentation] : normal skin color and pigmentation [Edema] : no peripheral edema [Exaggerated Use Of Accessory Muscles For Inspiration] : no accessory muscle use [Normal Station and Gait] : the gait and station were normal for the patient's age [Oriented To Time, Place, And Person] : oriented to person, place, and time [No Focal Deficits] : no focal deficits [FreeTextEntry1] : incisions healing well

## 2019-04-12 NOTE — ASSESSMENT
[FreeTextEntry1] : s/p RAL SPP with bladder stone removal. Doing well. \par --Delacruz removed today\par --IM ceftriaxone for UTI prevention\par --RTC in 1mo\par --No straining or heavy lifting\par --Hematuria precautions

## 2019-05-06 ENCOUNTER — APPOINTMENT (OUTPATIENT)
Dept: UROLOGY | Facility: CLINIC | Age: 49
End: 2019-05-06
Payer: MEDICAID

## 2019-05-06 VITALS — DIASTOLIC BLOOD PRESSURE: 119 MMHG | SYSTOLIC BLOOD PRESSURE: 163 MMHG | RESPIRATION RATE: 18 BRPM | HEART RATE: 81 BPM

## 2019-05-06 PROCEDURE — 99024 POSTOP FOLLOW-UP VISIT: CPT

## 2019-05-06 NOTE — HISTORY OF PRESENT ILLNESS
[FreeTextEntry1] : 48yo gentleman with cc of recurrent UTI and bladder stone. Pt was seen by urologist for recurrent UTIs. He has had 3 infections. He had no anatomic eval and was treated with abx each time. He saw a partner who looked through old records and found stone in bladder. In retrospect, he has had stone in his bladder for years (on CT from 2016). At baseline, he has urinary urgency and straining. He gets pain with straining. No family hx of prostate ca. No known hx of PSA. No prior hx of surgery. Had episode of retention in 2016 requiring catheter placement. \par \par Plan made for RAL SPP with bladder stone removal. He is now 1mo out. Doing well. Path was benign. \par \par He reports improvement in nocturia getting up 2-3 times per night. Daytime urinary frequency also improved. ALso improved urine flow. No feeling of infection.

## 2019-05-06 NOTE — PHYSICAL EXAM
[General Appearance - Well Developed] : well developed [General Appearance - Well Nourished] : well nourished [General Appearance - In No Acute Distress] : no acute distress [Abdomen Soft] : soft [Abdomen Tenderness] : non-tender [Costovertebral Angle Tenderness] : no ~M costovertebral angle tenderness [Skin Color & Pigmentation] : normal skin color and pigmentation [Edema] : no peripheral edema [Exaggerated Use Of Accessory Muscles For Inspiration] : no accessory muscle use [Oriented To Time, Place, And Person] : oriented to person, place, and time [Normal Station and Gait] : the gait and station were normal for the patient's age [No Focal Deficits] : no focal deficits [FreeTextEntry1] : incisions well healed

## 2019-05-06 NOTE — ASSESSMENT
[FreeTextEntry1] : s/p RAL SPP with bladder stone removal. Doing well. \par --UA, UCx\par --RTC in 3-6mo with PSA

## 2019-05-08 LAB
APPEARANCE: ABNORMAL
BACTERIA UR CULT: NORMAL
BACTERIA: NEGATIVE
BILIRUBIN URINE: NEGATIVE
BLOOD URINE: ABNORMAL
CALCIUM OXALATE CRYSTALS: ABNORMAL
COLOR: YELLOW
GLUCOSE QUALITATIVE U: NEGATIVE
HYALINE CASTS: 1 /LPF
KETONES URINE: NEGATIVE
LEUKOCYTE ESTERASE URINE: ABNORMAL
MICROSCOPIC-UA: NORMAL
NITRITE URINE: NEGATIVE
PH URINE: 6
PROTEIN URINE: ABNORMAL
RED BLOOD CELLS URINE: 89 /HPF
SPECIFIC GRAVITY URINE: 1.03
SQUAMOUS EPITHELIAL CELLS: 0 /HPF
UROBILINOGEN URINE: NORMAL
WHITE BLOOD CELLS URINE: 320 /HPF

## 2019-08-24 NOTE — H&P PST ADULT - GENERAL GENITOURINARY SYMPTOMS
Patient notified and verbalized understanding of information provided.   Rx sent.    increased urinary frequency/bladder infections/urinary hesitancy

## 2019-10-18 ENCOUNTER — APPOINTMENT (OUTPATIENT)
Dept: UROLOGY | Facility: CLINIC | Age: 49
End: 2019-10-18
Payer: MEDICAID

## 2019-10-18 DIAGNOSIS — Z87.448 PERSONAL HISTORY OF OTHER DISEASES OF URINARY SYSTEM: ICD-10-CM

## 2019-10-18 DIAGNOSIS — N13.8 BENIGN PROSTATIC HYPERPLASIA WITH LOWER URINARY TRACT SYMPMS: ICD-10-CM

## 2019-10-18 DIAGNOSIS — N39.0 URINARY TRACT INFECTION, SITE NOT SPECIFIED: ICD-10-CM

## 2019-10-18 DIAGNOSIS — N40.1 BENIGN PROSTATIC HYPERPLASIA WITH LOWER URINARY TRACT SYMPMS: ICD-10-CM

## 2019-10-18 PROCEDURE — 99213 OFFICE O/P EST LOW 20 MIN: CPT

## 2019-10-18 RX ORDER — SULFAMETHOXAZOLE AND TRIMETHOPRIM 800; 160 MG/1; MG/1
800-160 TABLET ORAL
Qty: 20 | Refills: 0 | Status: DISCONTINUED | COMMUNITY
Start: 2019-02-17 | End: 2019-10-18

## 2019-10-18 RX ORDER — LISINOPRIL 30 MG/1
30 TABLET ORAL
Refills: 0 | Status: ACTIVE | COMMUNITY

## 2019-10-18 RX ORDER — METFORMIN HYDROCHLORIDE 500 MG/1
500 TABLET, COATED ORAL
Refills: 0 | Status: ACTIVE | COMMUNITY

## 2019-10-18 RX ORDER — LOSARTAN POTASSIUM 50 MG/1
50 TABLET, FILM COATED ORAL
Refills: 0 | Status: DISCONTINUED | COMMUNITY
End: 2019-10-18

## 2019-10-18 NOTE — ASSESSMENT
[FreeTextEntry1] : s/p RAL SPP with bladder stone removal. Doing well. \par --PSA today\par --RTC in 1y with PSA and ANNAMARIE

## 2019-10-18 NOTE — REVIEW OF SYSTEMS
[Seen by urologist before (Name)  ___] : Preciously seen by a urologist: [unfilled] [Strain or push to urinate] : strain or push to urinate [Wake up at night to urinate  How many times?  ___] : wakes up to urinate [unfilled] times during the night [Bladder fullness after urinating] : bladder fullness after urinating [Negative] : Heme/Lymph

## 2019-10-18 NOTE — HISTORY OF PRESENT ILLNESS
[FreeTextEntry1] : 48yo gentleman with cc of recurrent UTI and bladder stone. Pt was seen by urologist for recurrent UTIs. He has had 3 infections. He had no anatomic eval and was treated with abx each time. He saw a partner who looked through old records and found stone in bladder. In retrospect, he has had stone in his bladder for years (on CT from 2016). At baseline, he has urinary urgency and straining. He gets pain with straining. No family hx of prostate ca. No known hx of PSA. No prior hx of surgery. Had episode of retention in 2016 requiring catheter placement. \par \par Pt underwent RAL SPP with bladder stone removal 4/2019. Path was benign. \par \par He reports improvement in nocturia getting up 1-2 times per night. Daytime urinary frequency also improved. He is extremely happy.

## 2019-10-18 NOTE — PHYSICAL EXAM
[General Appearance - Well Developed] : well developed [General Appearance - Well Nourished] : well nourished [General Appearance - In No Acute Distress] : no acute distress [Abdomen Soft] : soft [Abdomen Tenderness] : non-tender [Costovertebral Angle Tenderness] : no ~M costovertebral angle tenderness [Skin Color & Pigmentation] : normal skin color and pigmentation [Edema] : no peripheral edema [Exaggerated Use Of Accessory Muscles For Inspiration] : no accessory muscle use [Oriented To Time, Place, And Person] : oriented to person, place, and time [Normal Station and Gait] : the gait and station were normal for the patient's age [No Focal Deficits] : no focal deficits [FreeTextEntry1] : incisions well healed, PVR (15min later): 97cc

## 2019-10-21 LAB
PSA FREE FLD-MCNC: 18 %
PSA FREE SERPL-MCNC: 0.27 NG/ML
PSA SERPL-MCNC: 1.54 NG/ML

## 2020-02-24 NOTE — H&P PST ADULT - SYMPTOMS
In preparation for their surgical procedure above patient was screened for Obstructive Sleep Apnea (GABBY) using the STOP-Bang Questionnaire by the Pre-Admission Testing department. This is a pre-surgical screening tool for patient safety and serves as a recommendation, this WILL NOT cause cancellation of surgery. STOP-Bang Questionnaire  * Do you currently see a pulmonologist?  No     If yes STOP, do not complete. Patient follows with Dr.     1.  Do you snore loudly (able to be heard in the next room)? Yes    2. Do you often feel tired or sleepy during the daytime? No       3. Has anyone ever told you that you stop breathing during your sleep? No    4. Do you have or are you being treated for high blood pressure? No      5. BMI more than 35? BMI (Calculated): 29.5        No    6. Age over 48 years? 41 y.o. No    7. Neck Circumference greater than 17 inches for male or 16 inches for female? Measured           (visits only)            Not Applicable    8. Gender Male? Yes      TOTAL SCORE: 2    GABBY - Low Risk : Yes to 0 - 2 questions  GABBY - Intermediate Risk : Yes to 3 - 4 questions  GABBY - High Risk : Yes to 5 - 8 questions    Adapted from:   STOP Questionnaire: A Tool to Screen Patients for Obstructive Sleep Apnea   [facility] Criselda Cha M.B.B.S., Tomasa Jacobo M.D., Og Copeland. Dante Mitchell, Ph.D., GABRIEL Angel.B.B.S., GABRIEL Hernández.Sc., Carolann Farah M.D., Danny Villa. SIMRAN Sarkar.P.C.    Anesthesiology 2008; 354:769-24 Copyright 2008, the [address] of [employer]est[employer], [facility] 37.   ----------------------------------------------------------------------------------------------------------------
NPO after midnight  [employer] and drivers license  Wear comfortable clean clothing  Do not bring jewelry  Shower night before and morning of surgery with a liquid antibacterial soap  Bring list of medications with dosage and how often taken  Follow all instructions given by your physician   needed at discharge  Call [phone] for any questions
PAT call attempted, patient unavailable, left message to please call us back at your earliest convenience; [phone]
nausea- see MAR.   1540: Patient stating he is ready to go home. Patient is rating pain a \"3\"/10 and denying nausea. IV switched to an INT and patient is getting dressed with the assistance of his mom. 1552: Patient's IV removed at this time- no complications and dressing applied. 1554: Patient's dressing changed again on his nose. Scant amount of bloody drainage on it. 1559: Discharge instructions given and explained to the patient and the patient's mom- both verbalized understanding. 1604: Patient wheeled to the car and discharged home in stable condition with his mom.
none

## 2020-07-14 NOTE — H&P PST ADULT - CIGARETTES, NUMBER OF YRS
Entered by MARISOL NEVILLE SCRIBE  07/14/20 0823 





Acting as scribe for:MARLENE JACKSON MD





ED GI/





- General


Chief Complaint: Lower Abdominal Pain


Stated Complaint: ABDOMINAL PAIN


Time Seen by Provider: 07/14/20 07:40


Mode of Arrival: Wheelchair


Information source: Patient


Notes: 





This 46 year old male patient with a history of diverticulitis presents to the 

ED today with complaints of worsening lower abdominal pain for the past x1 week.

Patient states that he was seen here last week and was diagnosed with colitis. 

He was started on Cipro and bulk laxatives and discharged with instructions to 

follow up with a GI specialist, but states that they won't be able to see him 

until x10 days after he is done with his antibiotics. He reports shooting pain 

to his penis and anus when he tries to have a bowel movement, LBM was x2 days 

ago. He also notes poor appetite for the past week, but denies any nausea, 

vomiting, blood streaked bowels, or hematuria.


TRAVEL OUTSIDE OF THE [place] IN LAST 30 DAYS: No





- Related Data


Allergies/Adverse Reactions: 


                                        





No Known Allergies Allergy (Verified 07/02/17 15:36)


   











Past Medical History





- General


Information source: Patient, [facility] Records





- Social History


Smoking Status: Never Smoker


Cigarette use (# per day): No


Chew tobacco use (# tins/day): No


Frequency of alcohol use: Occasional


Drug Abuse: Marijuana


Family History: Reviewed & Not Pertinent


Patient has suicidal ideation: No


Patient has homicidal ideation: No


GI Medical History: Reports: Hx Diverticulitis, Hx Gastroesophageal Reflux 

Disease


Musculoskeletal Medical History: Reports Hx Arthritis - ankle from injury


Past Surgical History: Reports: Hx Orthopedic Surgery - left ankle





- Immunizations


Hx Diphtheria, Pertussis, Tetanus Vaccination: No





Review of Systems





- Review of Systems


Constitutional: No symptoms reported


EENT: No symptoms reported


Cardiovascular: No symptoms reported


Respiratory: No symptoms reported


Gastrointestinal: See HPI, Abdominal pain, Nausea, Vomiting, Poor appetite, Last

bowel movement - x2 days ago, Other - Rectal pain.  denies: Blood streaked 

bowels


Genitourinary: See HPI.  denies: Hematuria


Male Genitourinary: See HPI, Other - Penile pain


Musculoskeletal: No symptoms reported


Skin: No symptoms reported


Hematologic/Lymphatic: No symptoms reported


Neurological/Psychological: No symptoms reported


-: Yes All other systems reviewed and negative





Physical Exam





- Vital signs


Vitals: 


                                        











Temp Pulse Resp BP Pulse Ox


 


 98.3 F   84   16   141/72 H  99 


 


 07/14/20 06:45  07/14/20 06:45  07/14/20 06:45  07/14/20 06:45  07/14/20 06:45














- General


General appearance: Alert


In distress: None





- HEENT


Head: Normocephalic, Atraumatic


Eyes: Normal


Pupils: PERRL





- Respiratory


Respiratory status: No respiratory distress


Chest status: Nontender


Breath sounds: Normal


Chest palpation: Normal





- Cardiovascular


Rhythm: Regular


Heart sounds: Normal auscultation


Murmur: No


Friction rub: No


Gallop: None auscultated





- Abdominal


Inspection: Normal


Distension: No distension


Bowel sounds: Normal


Tenderness: Tender - Tenderness to palpation of lower quadrants bilaterally, 

Guarding, Rebound


Organomegaly: No organomegaly





- Rectal


Stool: Heme negative


Notes: 





Chaperone present





- Back


Back: Normal, Nontender





- Extremities


General upper extremity: Normal inspection


General lower extremity: Normal inspection.  No: Edema





- Neurological


Neuro grossly intact: Yes


Orientation: AAOx4


[place] Coma Scale Eye Opening: Spontaneous


[place] Coma Scale Verbal: Oriented


[place] Coma Scale Motor: Obeys Commands


[place] Coma Scale Total: 15





- Psychological


Associated symptoms: Normal affect, Normal mood





- Skin


Skin Temperature: Warm


Skin Moisture: Dry


Skin Color: Normal





Course





- Re-evaluation


Re-evalutation: 





07/14/20 16:58


Patient continues to have pain in his left lower quadrant region with marked 

guarding and rebound tenderness noted on initial presentation.


07/14/20 17:01


Case was discussed with Dr. Chang gastroenterologist, who stated that the 

patient needed to be evaluated by surgeons and also perhaps admitted to the 

hospitalist


Discussed case with Dr. Birch, and based on conversation patient was not a 

surgical emergent need at this time for any surgery and patient's needs to be 

treated for his diverticulitis which could be as an outpatient completion of 

antibiotics


Discussed case with Dr. greenberg regarding admitting patient to the hospital due 

to outpatient failure thus far on 7 days of antibiotics as patient condition 

worsen.





- Vital Signs


Vital signs: 


                                        











Temp Pulse Resp BP Pulse Ox


 


 98.4 F   77   16   146/72 H  100 


 


 07/14/20 16:30  07/14/20 16:30  07/14/20 16:30  07/14/20 16:30  07/14/20 16:30











07/14/20 16:59


Vital signs stable





- Laboratory


Result Diagrams: 


                                 07/14/20 08:26





                                 07/14/20 08:26


Laboratory results interpreted by me: 


                                        











  07/14/20 07/14/20 07/14/20





  08:24 08:26 08:26


 


WBC   14.7 H 


 


Lymph % (Auto)   7.5 L 


 


Absolute Neuts (auto)   12.0 H 


 


Seg Neutrophils %   81.7 H 


 


Sodium    136.0 L


 


Glucose    117 H


 


Urine Protein  30 H  














- Diagnostic Test


Radiology reviewed: Image reviewed, Reports reviewed


Radiology results interpreted by me: 





07/14/20 16:59


CT scan of abdomen and pelvis with IV and oral contrast disclose inflammatory 

changes in the sigmoid colon unchanged from prior previous CT 1 week ago.  

However differential includes this time that there could be refractory 

diverticulitis versus necrosis and/or a mass in the colon.





Discharge





- Discharge


Clinical Impression: 


 Diverticulitis





Condition: Good


Disposition: ADMITTED AS INPATIENT


Admitting Provider: Alia (Hospitalist)


Unit Admitted: Medical Floor





I personally performed the services described in the documentation, reviewed and

edited the documentation which was dictated to the scribe in my presence, and it

accurately records my words and actions. 3

## 2020-10-16 ENCOUNTER — APPOINTMENT (OUTPATIENT)
Dept: UROLOGY | Facility: CLINIC | Age: 50
End: 2020-10-16

## 2021-04-04 NOTE — H&P PST ADULT - NEGATIVE BREAST SYMPTOMS
no nipple discharge R/no breast lump R/no breast lump L/no nipple discharge L/no breast tenderness L/no breast tenderness R You can access the FollowMyHealth Patient Portal offered by [facility] by registering at the following website: [email or web address]. By joining [employer]’s FollowMyHealth portal, you will also be able to view your health information using other applications (apps) compatible with our system. You can access the FollowMyHealth Patient Portal offered by [facility] by registering at the following website: [email or web address]. By joining [employer]’s FollowMyHealth portal, you will also be able to view your health information using other applications (apps) compatible with our system.

## 2023-03-02 ENCOUNTER — APPOINTMENT (OUTPATIENT)
Dept: UROLOGY | Facility: CLINIC | Age: 53
End: 2023-03-02

## 2024-01-08 ENCOUNTER — APPOINTMENT (OUTPATIENT)
Dept: UROLOGY | Facility: CLINIC | Age: 54
End: 2024-01-08
